# Patient Record
Sex: MALE | Race: ASIAN | NOT HISPANIC OR LATINO | Employment: FULL TIME | URBAN - METROPOLITAN AREA
[De-identification: names, ages, dates, MRNs, and addresses within clinical notes are randomized per-mention and may not be internally consistent; named-entity substitution may affect disease eponyms.]

---

## 2018-05-01 ENCOUNTER — COMMUNICATION - HEALTHEAST (OUTPATIENT)
Dept: HEALTH INFORMATION MANAGEMENT | Facility: CLINIC | Age: 33
End: 2018-05-01

## 2018-05-01 ENCOUNTER — COMMUNICATION - HEALTHEAST (OUTPATIENT)
Dept: TELEHEALTH | Facility: CLINIC | Age: 33
End: 2018-05-01

## 2018-05-10 ENCOUNTER — COMMUNICATION - HEALTHEAST (OUTPATIENT)
Dept: SCHEDULING | Facility: CLINIC | Age: 33
End: 2018-05-10

## 2018-05-10 ENCOUNTER — OFFICE VISIT - HEALTHEAST (OUTPATIENT)
Dept: FAMILY MEDICINE | Facility: CLINIC | Age: 33
End: 2018-05-10

## 2018-05-10 DIAGNOSIS — S93.402A LEFT ANKLE SPRAIN: ICD-10-CM

## 2018-05-29 ENCOUNTER — OFFICE VISIT - HEALTHEAST (OUTPATIENT)
Dept: FAMILY MEDICINE | Facility: CLINIC | Age: 33
End: 2018-05-29

## 2018-05-29 DIAGNOSIS — S93.402A SPRAIN OF LEFT ANKLE, UNSPECIFIED LIGAMENT, INITIAL ENCOUNTER: ICD-10-CM

## 2019-07-12 ENCOUNTER — OFFICE VISIT - HEALTHEAST (OUTPATIENT)
Dept: FAMILY MEDICINE | Facility: CLINIC | Age: 34
End: 2019-07-12

## 2019-07-12 DIAGNOSIS — M79.645 PAIN OF FINGER OF LEFT HAND: ICD-10-CM

## 2020-10-29 ENCOUNTER — VIRTUAL VISIT (OUTPATIENT)
Dept: FAMILY MEDICINE | Facility: OTHER | Age: 35
End: 2020-10-29

## 2020-10-30 NOTE — PROGRESS NOTES
"Date: 10/29/2020 13:45:02  Clinician: Narendra Simon  Clinician NPI: [id number]  Patient: Barney Her  Patient : 1985  Patient Address: [address] [place] [address]  Patient Phone: [phone]  Visit Protocol: URI  Patient Summary:  Barney is a 34 year old ( : 1985 ) male who initiated a [facility] Visit for COVID-19 (Coronavirus) evaluation and screening. When asked the question \"Please sign me up to receive news, health information and promotions from [facility].\", Barney responded \"No\".    When asked when his symptoms started, Barney reported that he does not have any symptoms.   He denies having recent facial or sinus surgery in the past 60 days and taking antibiotic medication in the past month.    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 In the past 14 days, Barney has not worked or volunteered at a healthcare facility or group living setting.   In the past 14 days, he also has not lived in a congregate living setting.   Barney has had a close contact with a laboratory-confirmed COVID-19 patient in the last 14 days. He was not exposed at his work. Date Barney was exposed to the laboratory-confirmed COVID-19 patient: 10/24/2020   Additional information about contact with COVID-19 (Coronavirus) patient as reported by the patient (free text): 10/24/20 took family pictures for an hour outside and had lunch with positive covid person from 12-2pm that day.   Barney is not living in the same household with the COVID-19 positive patient. He was in an enclosed space for greater than 15 minutes with the COVID-19 patient.   During the encounter, neither were wearing masks.   Since 2019, Barney has not been diagnosed with lab-confirmed COVID-19 test and has not had upper respiratory infection or influenza-like illness.   Pertinent medical history  Barney needs a return to work/school note.   Weight: " 225 lbs   Barney does not smoke or use smokeless tobacco.   Weight: 225 lbs    MEDICATIONS: No current medications, ALLERGIES: NKDA  Clinician Response:  Dear Barney,   Based on your exposure to COVID-19 (coronavirus), we would like to test you for this virus.  1. Please call [phone] to schedule your visit. Explain that you were referred by [facility] to have a COVID-19 test. Be ready to share your [facility] visit ID number.   The following will serve as your written order for this COVID Test, ordered by me, for the indication of suspected COVID [Z20.828]: The test will be ordered in [facility], our electronic health record, after you are scheduled. It will show as ordered and authorized by Paras Mccall MD.  Order: COVID-19 (coronavirus) PCR for ASYMPTOMATIC EXPOSURE testing from [facility].   If you know you have had close contact with someone who tested positive, you should be quarantined for 14 days after this exposure. You should stay in quarantine for the14 days even if the covid test is negative, the optimal time to test after exposure is 5-7 days from the exposure  Quarantine means   What should I do?  For safety, it's very important to follow these rules. Do this for 14 days after the date you were last exposed to the virus..  Stay home and away from others. Don't go to school or anywhere else. Generally quarantine means staying home from work but there are some exceptions to this. Please contact your workplace.   No hugging, kissing or shaking hands.  Don't let anyone visit.  Cover your mouth and nose with a mask, tissue or washcloth to avoid spreading germs.  Wash your hands and face often. Use soap and water.  What are the symptoms of COVID-19?  The most common symptoms are cough, fever and trouble breathing. Less common symptoms include headache, body aches, fatigue (feeling very tired), chills, sore throat, stuffy or runny nose, diarrhea (loose poop), loss of taste or smell, belly pain, and nausea or vomiting (feeling  sick to your stomach or throwing up).  After 14 days, if you have still don't have symptoms, you likely don't have this virus.  If you develop symptoms, follow these guidelines.  If you're normally healthy: Please start another [facility] visit to report your symptoms. Go to [email or web address].  If you have a serious health problem (like cancer, heart failure, an organ transplant or kidney disease): Call your specialty clinic. Let them know that you might have COVID-19.  2. When it's time for your COVID test:  Stay at least 6 feet away from others. (If someone will drive you to your test, stay in the backseat, as far away from the  as you can.)  Cover your mouth and nose with a mask, tissue or washcloth.  Go straight to the testing site. Don't make any stops on the way there or back.  Please note  Caregivers in these groups are at risk for severe illness due to COVID-19:  o People 65 years and older  o People who live in a nursing home or long-term care facility  o People with chronic disease (lung, heart, cancer, diabetes, kidney, liver, immunologic)  o People who have a weakened immune system, including those who:  Are in cancer treatment  Take medicine that weakens the immune system, such as corticosteroids  Had a bone marrow or organ transplant  Have an immune deficiency  Have poorly controlled HIV or AIDS  Are obese (body mass index of 40 or higher)  Smoke regularly  Where can I get more information?  [facility] -- About COVID-19: [email or web address]  CDC -- What to Do If You're Sick: www.cdc.gov/coronavirus/2019-ncov/about/steps-when-sick.html  CDC -- Ending Home Isolation: www.cdc.gov/coronavirus/2019-ncov/hcp/disposition-in-home-patients.html  CDC -- Caring for Someone: www.cdc.gov/coronavirus/2019-ncov/if-you-are-sick/care-for-someone.html  [facility] -- Interim Guidance for Hospital Discharge to Home: www.health.[facility].[place]/diseases/coronavirus/hcp/hospdischarge.pdf  [facility]  trials (COVID-19 research studies): [email or web address].[email or web address]/[email or web address]-trials  Below are the COVID-19 hotlines at the [place] Department of Health ([facility]). Interpreters are available.  For health questions: Call 506-993-2528 or 1-144.297.2049 (7 a.m. to 7 p.m.)  For questions about schools and childcare: Call 761-224-8369 or 1-446.938.5604 (7 a.m. to 7 p.m.)    Diagnosis: Contact with and (suspected) exposure to other viral communicable diseases  Diagnosis ICD: Z20.828

## 2021-05-30 NOTE — PATIENT INSTRUCTIONS - HE
No fracture.    I have placed a referral to orthopedics.  Alternatively you could contact [facility] at [phone].  You need to see the hand specialist.    Please use Tylenol 650mg every 4 hours or ibuprofen 600mg every 6 hours by mouth for pain/fever.  Do not exceed 4000mg of acetaminophen or 2400mg of ibuprofen from any source in a 24 hour period.  Taking Tylenol and ibuprofen together may be helpful in reducing pain.

## 2021-05-30 NOTE — PROGRESS NOTES
"ASSESSMENT:   1. Pain of finger of left hand  XR Finger Left 2 or More VWS    XR Finger Left 2 or More VWS    Ambulatory referral to Orthopedics       PLAN:  X-ray was obtained, no fracture dislocation is identified.  I am unable to force patient into flexion of the left index finger, concern for flexor tendon injury.  He is referred urgently to hand, given contact information for [facility] as well as the [facility] specialty .  Advised him to follow-up with hand within the next 3 days.    I discussed red flag symptoms, return precautions to clinic/ER and follow up care with patient/parent.  Expected clinical course, symptomatic cares advised. Questions answered. Patient/parent amenable with plan.    Patient Instructions:  Patient Instructions   No fracture.    I have placed a referral to orthopedics.  Alternatively you could contact [facility] at [phone].  You need to see the hand specialist.    Please use Tylenol 650mg every 4 hours or ibuprofen 600mg every 6 hours by mouth for pain/fever.  Do not exceed 4000mg of acetaminophen or 2400mg of ibuprofen from any source in a 24 hour period.  Taking Tylenol and ibuprofen together may be helpful in reducing pain.        SUBJECTIVE:   Barney Vu is a 33 y.o. male who presents today with left index finger pain and an inability to flex his finger.  He initially \"jammed\" his finger while swatting a ball playing basketball about 3 weeks ago, there was immediate swelling, however not much pain.  He notes that about a week ago, he became more swollen between the PIP and MCP joint and developed pain with flexion.  He then forced himself into full extension of the digit with athletic tape and left in that position for about 1 week.  Over the past several days he has developed significantly increased pain with flexion at the PIP and MCP joints, and now cannot fully flex at the PIP or MCP joint.  Denies paresthesias.  No redness to any of the joints at any point.  No " fevers.      ROS:  Comprehensive 12 pt ROS completed, positives noted in HPI, otherwise negative.      Past Medical History:  There is no problem list on file for this patient.      Surgical History:  No past surgical history on file.        Family History:  No family history on file.    Reviewed; Non-contributory    Social History     Tobacco Use   Smoking Status Never Smoker   Smokeless Tobacco Never Used       Current Medications:  No current outpatient medications on file prior to visit.     No current facility-administered medications on file prior to visit.        Allergies:   No Known Allergies    OBJECTIVE:   Vitals:    07/12/19 0728   BP: 122/85   Patient Site: Right Arm   Patient Position: Sitting   Cuff Size: Adult Large   Pulse: (!) 57   Resp: 17   Temp: 97.9  F (36.6  C)   TempSrc: Oral   SpO2: 97%   Weight: (!) 228 lb 6 oz (103.6 kg)     Physical exam reveals a pleasant 33 y.o. male.   General: Appears healthy, alert and cooperative. Non-toxic appearance.  Pulmonary/Chest: even, unlabored  Heart: regular rate   Left hand: There is swelling between the PIP and MCP joints of the index finger, area non-tender to palpation.  AROM limited at PIP and MCP joint, unable to force joint into flexion.  Able to extend fully without difficulty.  No skin changes over joint.  Neuro: Alert, oriented. Non-focal.  Skin: pink, warm, dry, and without lesions on limited skin exam. No rashes.  Psychiatric: Normal mood and affect.  Normal judgement and thought content. Normal behavior.       RADIOLOGY    Xr Finger Left 2 Or More Vws    Result Date: 7/12/2019  EXAM DATE:         07/12/2019 EXAM: X-RAY EXAM OF FINGER(S),LEFT,MINIMUM TWO VIEWS LOCATION: [facility] DATE/TIME: 7/12/2019 8:00 AM INDICATION: trauma, unable to flex at MCP COMPARISON: None. FINDINGS: 3 views of the left index finger show preservation of joint spaces. No fracture or dislocation.       LABORATORY STUDIES    none      Leidy Osman  CNP

## 2021-06-01 VITALS — WEIGHT: 224 LBS

## 2021-06-03 VITALS — WEIGHT: 228.38 LBS

## 2021-06-17 NOTE — PROGRESS NOTES
Subjective:      Patient ID: Barney LOPEZ Her is a 32 y.o. male.    Chief Complaint:    HPI  Barney LOPEZ Her is a 32 y.o. male who presents today complaining of left ankle pain and effusion.  She recounts past medical history for being at home yesterday and carrying his child and his left arm stepping off of a nap and he had a car seat in the right arm.  He stumbled tripped and suffered an inversion injury to the left ankle.  He still been able to bear weight on it and continue with his activities of daily living.  He has had no hip or knee or foot pain of the ipsilateral left lower extremity or contralateral right lower extremity injury to report.  No break in the skin or ecchymoses.      No past medical history on file.    No past surgical history on file.    No family history on file.    Social History   Substance Use Topics     Smoking status: Never Smoker     Smokeless tobacco: Never Used     Alcohol use None       Review of Systems  As above in HPI, otherwise negative.    Objective:     /64  Pulse (!) 54  Temp 98.1  F (36.7  C) (Oral)   Resp 14  Wt (!) 224 lb (101.6 kg)  SpO2 96%    Physical Exam  General patient is resting comfortably no acute distress.  Her Axis appropriately with provider and answers questions directly.  He ambulates freely into the office exam room.  Musculoskeletal: Focused examination left lower extremity shows that the hip and knee are nontender palpation full range of motion no pain over the tibial plateau or at the head of the fibula.  Negative squeeze test at the distal left lower extremity.  No pain on the medial malleoli with palpation.  Maximal point tenderness to palpation does reproduce his symptoms over the anterior/ posterior talofibular ligament talar tilt test is positive.  Noticeable lateral malleoli soft tissue swelling and effusion  No pain over the tarsals metatarsals or the Lisfranc ligament.  This typically no pain over the proximal fifth  metatarsal.    Assessment:     Procedures    The encounter diagnosis was Left ankle sprain.    Plan:     1. Left ankle sprain  XR Ankle Left 3 or More VWS       Had a discussion that he will take 4-6 weeks to have a full strengthening and resolution of the ankle.  May be subject to reinjury with inversion especially on uneven ground.  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  She was cautioned regarding this reinjury and also use of support for the next 4-6 weeks with supportive shoe wear activity modification.  Suggested elevation as much as possible over the next 48 hours to help resolve the effusion.  Also, as much as possible, limited weightbearing on the left lower extremity to resolve the effusion.  He will follow-up in 12-14 days for re-x-ray reevaluation if he is not getting good resolution or is having inability to bear weight, increased ecchymoses or increased edema or increasing pain.    Patient Instructions   Nonweightbearing or protected weightbearing as needed  Elevate extremity above the level of the heart as much as possible especially on the first 2 days.  For the first 2 days ice the ankle 20 minutes on each hour while awake avoiding and taking precautions to damage the skin  Transition from an Ace wrap to a slip on neoprene sleeve for compression and to keep the area warm  Over-the-counter ibuprofen 600 mg 3 times a day with food for analgesia and anti-inflammatory effect as long as there is no contraindication from the medications.  General risks and benefits of the medication were gone over  Full resolution will be over the next 4-6 weeks but there should be the worst symptoms and progression of healing should be over the first 10-12 days.  If inability to bear weight, worsening swelling or effusion of the joint or ecchymoses return to clinic and for evaluation with orthopedics.    As a result of our visit today, here are the action plans for you:    1.  Medication(s) to stop: There are no discontinued medications.    2. Medication(s) to start or change: No medications were ordered this encounter      3. Other instructions: Yes     [facility] [employer] patient education handout on ankle sprain for patient education and symptomatic care

## 2021-06-26 NOTE — PROGRESS NOTES
Progress Notes by Jason Lima PA-C at 5/29/2018  7:10 AM     Author: Jason Lima PA-C Service: -- Author Type: Physician Assistant    Filed: 5/29/2018 10:08 AM Encounter Date: 5/29/2018 Status: Signed    : Jason Lima PA-C (Physician Assistant)       Subjective:      Patient ID: Barney Vu is a 32 y.o. male.    Chief Complaint:    HPI  Barney Vu is a 32 y.o. male who presents today complaining of painful swollen left ankle.  He recounts past medical history for hiking yesterday with a 20 pound pack on his back.  Is wearing regular tennis shoes.  He inverted his left ankle and sustained pain swelling and inability to bear full weight on the on the left lower extremity.  He states that he is bearing roughly 30% weight on the left lower extremity.  Denies any other injury to the foot knee hip of the ipsilateral side or the contralateral right lower extremity.  He did have a similar injury 3 weeks ago where he rolled his left ankle.  Was seen by myself in clinic on 5/10/2018, please see the note in epic for specifics.    He has tried rest and wrapping with an Ace wrap for treatment at home.    No past medical history on file.    No past surgical history on file.    No family history on file.    Social History   Substance Use Topics   ? Smoking status: Never Smoker   ? Smokeless tobacco: Never Used   ? Alcohol use None       Review of Systems  As above in HPI otherwise negative  Objective:     /64  Pulse 67  Temp 98.5  F (36.9  C) (Oral)   Resp 14  Wt (!) 224 lb (101.6 kg)  SpO2 97%    Physical Exam  General: She is resting comfortably he does walk into the clinic with an antalgic gait.  Focused examination of left lower extremity shows that the hip and knee are nontender to palpation without effusion and with full range of motion.  Left ankle is with effusion.  Minimal pain to palpation on the medial malleoli.  Maximal point tenderness to palpation which does reproduce his symptoms over the anterior  posterior talofibular ligaments.  Gentle inversion with a positive talar tilt test is painful for the patient and does reproduce his symptoms    Imaging    Xr Ankle Left 3 Or More Vws    Result Date: 5/29/2018  EXAM DATE:         05/29/2018 [facility] X-RAY ANKLE LEFT, MINIMUM THREE VIEWS 5/29/2018 7:30 AM INDICATION: Sprain of unspecified ligament of left ankle, init. COMPARISON: 05/10/2018. FINDINGS: Severe progressive lateral malleolus left ankle soft tissue swelling. Intact-appearing left ankle mortise and distal syndesmosis. No acute ankle fracture seen. Tibiotalar spurring. Probable 3 mm loose body in the posterior ankle joint.       I personally reviewed and discussed findings with the patient.  My own personal interpretation and radiologist will over read x-rays    Assessment:     Procedures    The encounter diagnosis was Sprain of left ankle, unspecified ligament, initial encounter.    Plan:     1. Sprain of left ankle, unspecified ligament, initial encounter  XR Ankle Left 3 or More VWS       Note for work is written for work restrictions. This is a non-work comp related illness. See work note in the chart.    Patient Instructions     Nonweightbearing or protected weightbearing as needed  Elevate extremity above the level of the heart as much as possible especially on the first 2 days.  For the first 2 days ice the ankle 20 minutes on each hour while awake avoiding and taking precautions to damage the skin  Transition from an Ace wrap to a slip on neoprene sleeve for compression and to keep the area warm  Over-the-counter ibuprofen 600 mg 3 times a day with food for analgesia and anti-inflammatory effect as long as there is no contraindication from the medications.  General risks and benefits of the medication were gone over  Full resolution will be over the next 4-6 weeks but there should be the worst symptoms and progression of healing should be over the first 10-12 days.  If inability  to bear weight, worsening swelling or effusion of the joint or ecchymoses return to clinic and for evaluation with orthopedics.  I did have a conversation with the patient regarding his tibiotalar spurring that was noted on the anterior talus.  Is a dental note was made of a loose body posteriorly seen best on the lateral view of the x-ray.  I further informed the patient that this finding on x-ray is beyond my expertise for this office visit and I did recommend follow-up with podiatry long-term for evaluation and what these findings mean on x-ray.  Patient voiced understanding of that concern.  Note for work was written.    As a result of our visit today, here are the action plans for you:    1. Medication(s) to stop: There are no discontinued medications.    2. Medication(s) to start or change: No medications were ordered this encounter      3. Other instructions: Yes               Treating Ankle Sprains  Treatment will depend on how bad your sprain is. For a severe sprain, healing may take 3 months or more.  Right after your injury: Use R.I.C.E.    Rest: At first, keep weight off the ankle as much as you can. You may be given crutches to help you walk without putting weight on the ankle.    Ice: Put an ice pack on the ankle for 20 minutes. Remove the pack and wait at least 30 minutes. Repeat for up to 3 days. This helps reduce swelling.    Compression: To reduce swelling and keep the joint stable, you may need to wrap the ankle with an elastic bandage. For more severe sprains, you may need an ankle brace, a boot, or a cast.    Elevation: To reduce swelling, keep your ankle raised above your heart when you sit or lie down.  Medicine  Your healthcare provider may suggest oral nonsteroidal anti-inflammatory medicine (NSAIDs), such as ibuprofen. This relieves the pain and helps reduce swelling. Be sure to take your medicine as directed.  Exercises    After about 2 to 3 weeks, you may be given exercises to strengthen  the ligaments and muscles in the ankle. Doing these exercises will help prevent another ankle sprain. Exercises may include standing on your toes and then on your heels and doing ankle curls.  Ankle curls    Sit on the edge of a sturdy table or lie on your back.    Pull your toes toward you. Then point them away from you. Repeat for 2 to 3 minutes.   Date Last Reviewed: 1/1/2018 2000-2017 [employer]. [address] [place] [address]. All rights reserved. This information is not intended as a substitute for professional medical care. Always follow your healthcare professional's instructions.        Ankle Sprain (Adult)  An ankle sprain is a stretching or tearing of the ligaments that hold the ankle joint together. There are no broken bones.  An ankle sprain is a common injury for both children and adults. It happens when the ankle turns, twists, or rolls in an awkward way. This can be caused by a sports injury. Or it can happen from doing something as simple as stepping on an uneven surface.  Ligaments are made of tough connective tissue. Normally, ligaments stretch a certain amount and then go back to their normal place. A sprain happens when a ligament is forced to stretch more than the normal amount. A severe sprain can actually tear the ligaments. If you have a severe sprain, you may have felt or heard something like a pop when you were injured.  Ankle sprains are given a grade depending on whether they are mild, moderate, or severe:    Grade 1 sprain. A mild sprain with minor stretching and damage to the ligament.    Grade 2 sprain. A moderate sprain where the ligament is partly torn.    Grade 3 sprain. The most severe kind of sprain. The ligament is completely torn.  Most sprains take about 4 to 6 weeks to heal. A severe sprain can take several months to recover.  Your healthcare provider may order X-rays to be sure you dont have a fracture, or broken bone.  The injured area will feel  sore.  Swelling and pain may make it hard to walk. You may need crutches if walking is painful. Or your provider may have you use a cast boot or air splint. This will depend on the grade of ankle sprain that you have.    Home care    For a Grade 1 sprain, use RICE (rest, ice, compression, and elevation):    Rest your ankle. Dont walk on it.    Ice should be used right away to help control swelling. Place an ice pack over the injured area for 20 minutes. Do this every 3 to 6 hours for the first 24 to 48 hours. Keep using ice packs to ease pain and swelling as needed. To make an ice pack, put ice cubes in a plastic bag that seals at the top. Wrap the bag in a clean, thin towel or cloth. Never put ice or an ice pack directly on the skin. The ice pack can be put right on the cast, bandage, or splint. As the ice melts, be careful that the cast, bandage, or splint doesnt get wet. If you have a boot, open it to apply an ice pack, unless told otherwise by your provider.    Compression devices help to control swelling. They also keep the ankle from moving and support your injured ankle. These devices include dressings, bandages, and wraps.    Elevate or raise your ankle above the level of your heart when sitting or lying down. This is very important for the first 48 hours.    Follow the RICE guidelines for a Grade 2 sprain. This type of sprain will take longer to heal. Your provider may have you wear a splint, cast, or brace to keep your ankle from moving.     If you have a Grade 3 sprain, you are at risk for long-term ankle instability. In rare cases, surgery may be needed. Your provider may have you wear a short leg cast or a walking boot for 2 to 3 weeks.    After 48 hours, it may be helpful to apply heat for 20 minutes several times a day. You can do this with a heating pad or warm compress. Or you may want to go back and forth between using ice and heat. Never apply heat directly to the skin. Always wrap the heating pad  or warm compress in a clean, thin towel or cloth.    You may use over-the-counter pain medicine (NSAIDS or nonsteroidal anti-inflammatory drugs) to control pain, unless another pain medicine was prescribed. Talk with your provider before using these medicines if you have chronic liver or kidney disease, or have ever had a stomach ulcer or GI (gastrointestinal) bleeding.    Follow any rehabilitation exercises your provider gives you. These can help you be more flexible and improve your balance and coordination. This is helpful in preventing long-term ankle problems.  Prevention  To help prevent ankle sprains, its important to have good strength, balance, and flexibility. Be sure to:    Always warm up before you exercise or do something very active    Be careful when walking or running on uneven or cracked surfaces    Wear shoes that are in good condition and fit well    Listen to your bodys signals to slow down when you are in pain or tired  Follow-up care  Any X-rays you had today dont show any broken bones, breaks, or fractures. Sometimes fractures dont show up on the first X-ray. Bruises and sprains can sometimes hurt as much as a fracture. These injuries can take time to heal completely. If your symptoms dont get better or they get worse, talk with your healthcare provider. You may need a repeat X-ray.  Follow up with your healthcare provider, or as advised. Check for any warning signs listed below.  When to seek medical advice  Call your healthcare provider right away if any of these occur:    Fever of 100.4 F (38 C) or higher, or as directed by your healthcare provider    The injury doesnt seem to be healing    The swelling comes back    The cast has a bad smell    The plaster cast or splint gets wet or soft    The fiberglass cast or splint gets wet and does not dry for 24 hours    The pain or swelling increases, or redness appears    Your toes become cold, blue, numb, or tingly    The skin is discolored (looks  blue, purple, or gray), has blisters, or is irritated    You re-injure your ankle  Date Last Reviewed: 11/20/2015 2000-2017 [employer]. [address] [place] [address], [place] [address]. All rights reserved. This information is not intended as a substitute for professional medical care. Always follow your healthcare professional's instructions.

## 2021-08-21 ENCOUNTER — HEALTH MAINTENANCE LETTER (OUTPATIENT)
Age: 36
End: 2021-08-21

## 2021-10-16 ENCOUNTER — HEALTH MAINTENANCE LETTER (OUTPATIENT)
Age: 36
End: 2021-10-16

## 2022-10-01 ENCOUNTER — HEALTH MAINTENANCE LETTER (OUTPATIENT)
Age: 37
End: 2022-10-01

## 2023-03-28 LAB
ANION GAP SERPL CALCULATED.3IONS-SCNC: 10 MMOL/L (ref 7–15)
BASOPHILS # BLD AUTO: 0.1 10E3/UL (ref 0–0.2)
BASOPHILS NFR BLD AUTO: 1 %
BUN SERPL-MCNC: 16.2 MG/DL (ref 6–20)
CALCIUM SERPL-MCNC: 9.5 MG/DL (ref 8.6–10)
CHLORIDE SERPL-SCNC: 106 MMOL/L (ref 98–107)
CREAT SERPL-MCNC: 1.3 MG/DL (ref 0.67–1.17)
DEPRECATED HCO3 PLAS-SCNC: 24 MMOL/L (ref 22–29)
EOSINOPHIL # BLD AUTO: 0.2 10E3/UL (ref 0–0.7)
EOSINOPHIL NFR BLD AUTO: 4 %
ERYTHROCYTE [DISTWIDTH] IN BLOOD BY AUTOMATED COUNT: 11.9 % (ref 10–15)
GFR SERPL CREATININE-BSD FRML MDRD: 73 ML/MIN/1.73M2
GLUCOSE SERPL-MCNC: 94 MG/DL (ref 70–99)
HCT VFR BLD AUTO: 45.4 % (ref 40–53)
HGB BLD-MCNC: 15.2 G/DL (ref 13.3–17.7)
IMM GRANULOCYTES # BLD: 0 10E3/UL
IMM GRANULOCYTES NFR BLD: 0 %
LYMPHOCYTES # BLD AUTO: 2 10E3/UL (ref 0.8–5.3)
LYMPHOCYTES NFR BLD AUTO: 33 %
MCH RBC QN AUTO: 29.7 PG (ref 26.5–33)
MCHC RBC AUTO-ENTMCNC: 33.5 G/DL (ref 31.5–36.5)
MCV RBC AUTO: 89 FL (ref 78–100)
MONOCYTES # BLD AUTO: 0.5 10E3/UL (ref 0–1.3)
MONOCYTES NFR BLD AUTO: 8 %
NEUTROPHILS # BLD AUTO: 3.4 10E3/UL (ref 1.6–8.3)
NEUTROPHILS NFR BLD AUTO: 54 %
NRBC # BLD AUTO: 0 10E3/UL
NRBC BLD AUTO-RTO: 0 /100
PLATELET # BLD AUTO: 265 10E3/UL (ref 150–450)
POTASSIUM SERPL-SCNC: 4 MMOL/L (ref 3.4–5.3)
RBC # BLD AUTO: 5.12 10E6/UL (ref 4.4–5.9)
SODIUM SERPL-SCNC: 140 MMOL/L (ref 136–145)
TROPONIN T SERPL HS-MCNC: <6 NG/L
WBC # BLD AUTO: 6.1 10E3/UL (ref 4–11)

## 2023-03-28 PROCEDURE — 80048 BASIC METABOLIC PNL TOTAL CA: CPT | Performed by: EMERGENCY MEDICINE

## 2023-03-28 PROCEDURE — 93005 ELECTROCARDIOGRAM TRACING: CPT | Performed by: STUDENT IN AN ORGANIZED HEALTH CARE EDUCATION/TRAINING PROGRAM

## 2023-03-28 PROCEDURE — 85025 COMPLETE CBC W/AUTO DIFF WBC: CPT | Performed by: EMERGENCY MEDICINE

## 2023-03-28 PROCEDURE — 93005 ELECTROCARDIOGRAM TRACING: CPT | Performed by: EMERGENCY MEDICINE

## 2023-03-28 PROCEDURE — 84484 ASSAY OF TROPONIN QUANT: CPT | Performed by: STUDENT IN AN ORGANIZED HEALTH CARE EDUCATION/TRAINING PROGRAM

## 2023-03-28 PROCEDURE — 99285 EMERGENCY DEPT VISIT HI MDM: CPT | Mod: 25

## 2023-03-28 PROCEDURE — 84484 ASSAY OF TROPONIN QUANT: CPT | Performed by: EMERGENCY MEDICINE

## 2023-03-28 PROCEDURE — 85025 COMPLETE CBC W/AUTO DIFF WBC: CPT | Performed by: STUDENT IN AN ORGANIZED HEALTH CARE EDUCATION/TRAINING PROGRAM

## 2023-03-28 PROCEDURE — 80048 BASIC METABOLIC PNL TOTAL CA: CPT | Performed by: STUDENT IN AN ORGANIZED HEALTH CARE EDUCATION/TRAINING PROGRAM

## 2023-03-28 PROCEDURE — 36415 COLL VENOUS BLD VENIPUNCTURE: CPT | Performed by: STUDENT IN AN ORGANIZED HEALTH CARE EDUCATION/TRAINING PROGRAM

## 2023-03-29 ENCOUNTER — HOSPITAL ENCOUNTER (EMERGENCY)
Facility: HOSPITAL | Age: 38
Discharge: HOME | End: 2023-03-29
Attending: EMERGENCY MEDICINE | Admitting: EMERGENCY MEDICINE
Payer: COMMERCIAL

## 2023-03-29 ENCOUNTER — APPOINTMENT (OUTPATIENT)
Dept: RADIOLOGY | Facility: HOSPITAL | Age: 38
End: 2023-03-29
Attending: EMERGENCY MEDICINE
Payer: COMMERCIAL

## 2023-03-29 VITALS
BODY MASS INDEX: 36.16 KG/M2 | SYSTOLIC BLOOD PRESSURE: 151 MMHG | WEIGHT: 225 LBS | HEIGHT: 66 IN | TEMPERATURE: 97.7 F | DIASTOLIC BLOOD PRESSURE: 92 MMHG | HEART RATE: 60 BPM | RESPIRATION RATE: 16 BRPM | OXYGEN SATURATION: 98 %

## 2023-03-29 DIAGNOSIS — R07.9 CHEST PAIN, UNSPECIFIED TYPE: ICD-10-CM

## 2023-03-29 LAB
ATRIAL RATE - MUSE: 54 BPM
DIASTOLIC BLOOD PRESSURE - MUSE: NORMAL MMHG
INTERPRETATION ECG - MUSE: NORMAL
P AXIS - MUSE: 44 DEGREES
PR INTERVAL - MUSE: 166 MS
QRS DURATION - MUSE: 104 MS
QT - MUSE: 390 MS
QTC - MUSE: 369 MS
R AXIS - MUSE: -38 DEGREES
SYSTOLIC BLOOD PRESSURE - MUSE: NORMAL MMHG
T AXIS - MUSE: 12 DEGREES
VENTRICULAR RATE- MUSE: 54 BPM

## 2023-03-29 PROCEDURE — 71046 X-RAY EXAM CHEST 2 VIEWS: CPT

## 2023-03-29 NOTE — ED TRIAGE NOTES
Patient arrives from home c/o chest pains that have been ongoing for the past month that has started to grow progressively worse over the past week.     States it intermittently pulsated and squeezes.   States he feels it more when he is resting than when he is doing activities.

## 2023-03-29 NOTE — DISCHARGE INSTRUCTIONS
You were seen in the emergency department for left-sided chest pain.  Your evaluation included EKG and blood testing related to your heart which did not show any signs of heart injury or other significant abnormality.  Your x-ray also looked clear and we are reassured by the rest of your basic lab tests your exam with reassuring vital signs and normal oxygen levels.  We think your symptoms could be related to something like costochondritis or pleurisy which is more of a chest wall discomfort where we usually start treatment with scheduling anti-inflammatory medications like ibuprofen 600 mg every 6 hours.  Would recommend doing this for the next several days to see if it is helpful with your pain.  Stick to a very bland diet and avoid spicy foods fatty foods and alcohol as well.  Would like you to try to follow-up in a primary care clinic, you can follow-up with your family doctor to review any ongoing concerns after this ED visit or contact a primary care scheduling line using the information above
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
## 2023-10-14 ENCOUNTER — HEALTH MAINTENANCE LETTER (OUTPATIENT)
Age: 38
End: 2023-10-14

## 2024-12-01 ENCOUNTER — HEALTH MAINTENANCE LETTER (OUTPATIENT)
Age: 39
End: 2024-12-01